# Patient Record
Sex: MALE | Race: WHITE | NOT HISPANIC OR LATINO | URBAN - METROPOLITAN AREA
[De-identification: names, ages, dates, MRNs, and addresses within clinical notes are randomized per-mention and may not be internally consistent; named-entity substitution may affect disease eponyms.]

---

## 2021-06-16 PROBLEM — K59.09 OTHER CONSTIPATION: Status: ACTIVE | Noted: 2018-05-16

## 2021-06-16 PROBLEM — E80.6 HYPERBILIRUBINEMIA: Status: ACTIVE | Noted: 2018-05-16

## 2021-06-16 PROBLEM — K29.70 GASTRITIS: Status: ACTIVE | Noted: 2018-05-16

## 2021-06-16 PROBLEM — N41.9 PROSTATITIS: Status: ACTIVE | Noted: 2018-05-13

## 2022-05-24 ENCOUNTER — TELEPHONE (OUTPATIENT)
Dept: NURSING | Facility: CLINIC | Age: 67
End: 2022-05-24

## 2022-05-24 NOTE — TELEPHONE ENCOUNTER
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  [facility] is only offering oral anti-viral medications. Caller is very upset. Caller asking if [facility] will prescribe the IV monoclonal antibody therapies.   Informed that our protocol speaks to both oral and IV anti-viral medications but that I am not a provider who prescribes the medication. What medications patient would qualify for is not within my scope of practice. The nurse advisor line is to triage symptoms.   Caller asking repeatedly what medications [facility] is offering and if it is any different from [facility]. Informed caller that the [place] Department of Health is overseeing the distribution of the monoclonal antibodies so I would assume [facility] is following the same guidelines as [facility].   Caller very upset, asking again if any IV monoclonal therapies are being offered. Informed that the [facility] has this information on their website and she is free to look there. Caller states she does not want to take the time to look there or go through the process and cost of having another visit with [facility] provider if the IV monoclonal antibodies is not going to be offered. Patient does not want to take the oral medication that has now shown to have rebound Covid effects.     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    Remdesivir is an established antiviral drug. It works by blocking the virus from making copies of itself (replicating). Remdesivir is given through a needle in the vein (intravenously) over time, which is called an IV infusion.  Remdesivir is also used to treat patients who are hospitalized with  more severe illness due to COVID-19. If you are hospitalized due to COVID-19, your health care providers will decide if remdesivir or other treatments are needed.    Caller thanked me for the information and terminated call.  Abbie Barillas RN   05/24/22 2:41 PM  [facility] Nurse Advisor